# Patient Record
(demographics unavailable — no encounter records)

---

## 2024-11-18 NOTE — DATA REVIEWED
[de-identified] : X-rays of the Shoulder (2 views) were obtained and independently reviewed at today's office visit on 11/18/24. Continued visualization of maintained reduction.  X-rays of the Shoulder (2 views) were obtained at [facility] [place] [facility] on 11/4/24 and reviewed at today's office visit. Interval reduction of the glenohumeral joint. Flattening of the posterolateral aspect of the humeral head best seen on axillary view, consistent with Hill-Sachs deformity.

## 2024-11-18 NOTE — HISTORY OF PRESENT ILLNESS
[FreeTextEntry1] : Juanis is a 12 year old, otherwise healthy, female presenting to the office today with her father for initial pediatric orthopedic evaluation of a right shoulder injury. Patient was spiking at [employer] on 11/4/24, now 2 weeks ago, when she dislocated her right shoulder. Patient was seen at [facility] after sustaining the injury where she underwent closed reduction of right shoulder dislocation, placed in a sling for immobilization, and was recommended for further orthopedic evaluation. Of note, patient has previous history of a right shoulder dislocation in July 2024. This now the second episode. At that time, patient underwent a course of physical therapy for the shoulder.  Today, she reports significant improvement in her pain and discomfort. She has been compliant with the sling. She is not requiring pain medications. Patient denies any numbness or tingling. Here today for further orthopedic evaluation of the above.

## 2024-11-18 NOTE — PHYSICAL EXAM
[FreeTextEntry1] : GENERAL: alert, cooperative, in NAD SKIN: The skin is intact, warm, pink and dry over the area examined. EYES: Normal conjunctiva, normal eyelids and pupils were equal and round. ENT: normal ears, normal nose and normal lips. CARDIOVASCULAR: brisk capillary refill, but no peripheral edema. RESPIRATORY: The patient is in no apparent respiratory distress. They're taking full deep breaths without use of accessory muscles or evidence of audible wheezes or stridor without the use of a stethoscope. Normal respiratory effort. ABDOMEN: not examined  Right Shoulder: Skin is clean and intact. No skin tenting or irritation. No blanching of skin. No ecchymosis. There is no tenderness with palpation globally over the right shoulder.  Limited active range of motion about the shoulder. FROM of the elbow, wrist, hand. Neurovascularly intact in r/m/u/ain distribution with 5/5 strength Radial pulse 2+. Brisk capillary refill in all digits.

## 2024-11-18 NOTE — REASON FOR VISIT
[Initial Evaluation] : an initial evaluation [Patient] : patient [Father] : father [FreeTextEntry1] : right shoulder dislocation

## 2024-11-18 NOTE — REVIEW OF SYSTEMS
[Change in Activity] : change in activity [Joint Pains] : arthralgias [Appropriate Age Development] : development appropriate for age [Fever Above 102] : no fever [Malaise] : no malaise [Rash] : no rash [Itching] : no itching [Eye Pain] : no eye pain [Redness] : no redness [Nasal Stuffiness] : no nasal congestion [Sore Throat] : no sore throat [Heart Problems] : no heart problems [Tachypnea] : no tachypnea [Wheezing] : no wheezing [Vomiting] : no vomiting [Joint Swelling] : no joint swelling

## 2024-11-18 NOTE — ASSESSMENT
[FreeTextEntry1] : Juanis is a 12 year old female with a right shoulder dislocation, recurrent episode.  -Today's assessment was performed with the assistance of the patient's parent as an independent historian given the patient's age, who could not be considered a reliable history/due to the nonverbal nature given the patient's young age. -Clinical findings, x-ray results, and documentation from hospital were reviewed at length with the patient and parent. The condition, natural history, and prognosis were explained to the patient and family. -X-rays of the Shoulder (2 views) were obtained at [facility] on 11/4/24 and reviewed at today's office visit. Interval reduction of the glenohumeral joint. Flattening of the posterolateral aspect of the humeral head best seen on axillary view, consistent with Hill-Sachs deformity. -X-rays of the Shoulder (2 views) were obtained and independently reviewed at today's office visit on 11/18/24. Continued visualization of maintained reduction. -Clinically, she has improvement in her pain and discomfort with limited range of motion of the shoulder. -Recommendation at this time is to wean out of the sling over the next 1 week as tolerated.  -As this is her second episode of dislocation, we also recommend that she obtain MRI of the Right Shoulder to rule out internal derangement and further evaluate her injury. Study was ordered at today's visit and our office will reach out to patient's family to obtain insurance authorization. -She should refrain from all activities, gym, sports, etc. at this time. School note provided at today's office visit. -We will see her back in the office in after the MRI is obtained for reevaluation and to review results.   All questions and concerns were addressed today. Parent and patient verbalize understanding and agree with plan of care.  I, Edith Jones PA-C, have acted as a scribe and documented the above information for Dr. Merino.

## 2025-01-13 NOTE — HISTORY OF PRESENT ILLNESS
[FreeTextEntry1] : Juanis is a 12 year old F with R shoulder dislocation event on 11/4/24.  Patient was spiking at [place] on 11/4/24, when she dislocated her right shoulder. Patient was seen at [facility] ER after sustaining the injury where she underwent closed reduction of right shoulder dislocation, placed in a sling for immobilization, and was recommended for further orthopedic evaluation. Of note, patient has previous history of a right shoulder dislocation in July 2024. This now the second episode. At that time, patient underwent a course of physical therapy for the shoulder. At visit in our office on 11/18/24, we recommended 1 more week of immobilization with a sling and also to have MR performed. See previous notes for details.   Today, she reports significant improvement in her pain and discomfort. She discontinued the sling after 1 week after last visit, as recommended. She denies any problems with shoulder movement. She is not requiring pain medications. Patient denies any numbness or tingling. Here today for further orthopedic evaluation of the above.

## 2025-01-13 NOTE — DATA REVIEWED
[de-identified] : 11/21/2024: MR R shoulder:  FINDINGS: ROTATOR CUFF: The supraspinatus, infraspinatus, subscapularis, and teres minor tendons are intact. MUSCLES: There is edema along the caudal myotendinous fibers of the subscapularis muscle consistent with muscle strain. No fatty atrophy of the rotator cuff musculature. LONG HEAD BICEPS TENDON: Long head biceps tendon is intact. There is circumferential fluid within the extra articular biceps tendon sheath likely related to decompression of joint fluid. GLENOHUMERAL JOINT: There is an acute appearing Hill-Sachs deformity along post lateral aspect of the humeral head measuring up to 1.8 cm in transverse dimension and 1.3 cm in craniocaudal dimension. The depth of the Hill-Sachs deformity measures up to 0.4 cm. The anteroinferior glenoid labrum is blunted and diminutive in appearance consistent with associated labral tear. No definite osseous component. There is mild intrinsic periligamentous edema about the inferior capsule suggestive of capsular sprain. SYNOVIUM: Small glenohumeral joint effusion. No subacromial/subdeltoid bursitis. ACROMIOCLAVICULAR JOINT: Intact. MARROW: Patient is skeletally immature. NERVES: Intact. SUBCUTANEOUS TISSUES: Unremarkable. IMPRESSION: Findings consistent with recent anterior translational injury. Acute shallow Hill-Sachs deformity along the posterior lateral aspect of the humeral head. Anteroinferior labrum is blunted and diminutive consistent with labral tearing. No definite osseous component. Mild sprain of the inferior capsular structures. Mild to moderate strain of the caudal myotendinous fibers of the subscapularis tendon. No rotator cuff tear.   X-rays of the Shoulder (2 views) were obtained and independently reviewed at today's office visit on 11/18/24. Continued visualization of maintained reduction.  X-rays of the Shoulder (2 views) were obtained at [facility] on 11/4/24 and reviewed at today's office visit. Interval reduction of the glenohumeral joint. Flattening of the posterolateral aspect of the humeral head best seen on axillary view, consistent with Hill-Sachs deformity.

## 2025-01-13 NOTE — HISTORY OF PRESENT ILLNESS
[FreeTextEntry1] : Juanis is a 12 year old F with R shoulder dislocation event on 11/4/24.  Patient was spiking at volleyball on 11/4/24, when she dislocated her right shoulder. [place] [facility] [place] the injury where she underwent closed reduction of right shoulder dislocation, placed in a sling for immobilization, and was recommended for further orthopedic evaluation. Of note, patient has previous history of a right shoulder dislocation in July 2024. This now the second episode. At that time, patient underwent a course of physical therapy for the shoulder. At visit in our office on 11/18/24, we recommended 1 more week of immobilization with a sling and also to have MR performed. See previous notes for details.   Today, she reports significant improvement in her pain and discomfort. She discontinued the sling after 1 week after last visit, as recommended. She denies any problems with shoulder movement. She is not requiring pain medications. Patient denies any numbness or tingling. Here today for further orthopedic evaluation of the above.

## 2025-01-13 NOTE — PHYSICAL EXAM
[FreeTextEntry1] : General: healthy appearing, acting appropriate for age.  HEENT: NCAT, Normal conjunctiva Cardio: Appears well perfused, no peripheral edema, brisk cap refill.  Lungs: no obvious increased WOB, no audible wheeze heard without use of stethoscope.  Abdomen: not examined.  Skin: No visible rashes on exposed skin  Right Shoulder: Skin is clean and intact. No skin tenting or irritation. No blanching of skin. No ecchymosis. No tenderness with palpation globally over the right shoulder.  FROM of the shoulder, painless, no clicking.  FROM of the elbow, wrist, hand. Neurovascularly intact in r/m/u/ain distribution with 5/5 strength Radial pulse 2+. Brisk capillary refill in all digits.

## 2025-01-13 NOTE — PHYSICAL EXAM
[FreeTextEntry1] : General: healthy appearing, acting appropriate for age.  HEENT: NCAT, Normal conjunctiva Cardio: Appears well perfused, no peripheral edema, brisk cap refill.  Lungs: no obvious increased WOB, no audible wheeze heard without use of stethoscope.  Abdomen: not examined.  Skin: No visible rashes on exposed skin  Right Shoulder: Skin is clean and intact. No skin tenting or irritation. No blanching of skin. No ecchymosis. No tenderness with palpation globally over the right shoulder.  FROM of the shoulder, painless, no clicking.  FROM of the elbow, wrist, hand. Neurovascularly intact in r/m/u/ain distribution with 5/5 strength Radial pulse 2+. Brisk capillary refill in all digits. home

## 2025-01-13 NOTE — REASON FOR VISIT
[Follow Up] : a follow up visit [Patient] : patient [Father] : father [FreeTextEntry1] : right shoulder dislocation

## 2025-01-13 NOTE — ASSESSMENT
[FreeTextEntry1] : Juanis is a 12 year old female with a right shoulder dislocation, recurrent episode.  - Today's assessment was performed with the assistance of the patient's parent as an independent historian given the patient's age, who could not be considered a reliable history/due to the nonverbal nature given the patient's young age. - Clinical findings, x-ray results, and documentation from hospital were reviewed at length with the patient and parent. The condition, natural history, and prognosis were explained to the patient and family. - MR R shoulder: IMPRESSION: Findings consistent with recent anterior translational injury. Acute shallow Hill-Sachs deformity along the posterior lateral aspect of the humeral head. Anteroinferior labrum is blunted and diminutive consistent with labral tearing. No definite osseous component. Mild sprain of the inferior capsular structures. Mild to moderate strain of the caudal myotendinous fibers of the subscapularis tendon. No rotator cuff tear. - Clinically, she has improvement in her pain and discomfort, improvement with range of motion of the shoulder. - No immobilization needed.  - Recommend a course of PT, Rx provided.  - She should refrain from all activities, gym, sports, etc. at this time. School note provided at today's office visit. - Return in 8 weeks for f/u evaluation. No XRs need to be ordered in advance.   All questions and concerns were addressed today. Parent and patient verbalize understanding and agree with plan of care.  I, Kusum Quan PA-C, have acted as a scribe and documented the above information for Dr. Merino.

## 2025-01-13 NOTE — DATA REVIEWED
[de-identified] : 11/21/2024: MR R shoulder:  FINDINGS: ROTATOR CUFF: The supraspinatus, infraspinatus, subscapularis, and teres minor tendons are intact. MUSCLES: There is edema along the caudal myotendinous fibers of the subscapularis muscle consistent with muscle strain. No fatty atrophy of the rotator cuff musculature. LONG HEAD BICEPS TENDON: Long head biceps tendon is intact. There is circumferential fluid within the extra articular biceps tendon sheath likely related to decompression of joint fluid. GLENOHUMERAL JOINT: There is an acute appearing Hill-Sachs deformity along post lateral aspect of the humeral head measuring up to 1.8 cm in transverse dimension and 1.3 cm in craniocaudal dimension. The depth of the Hill-Sachs deformity measures up to 0.4 cm. The anteroinferior glenoid labrum is blunted and diminutive in appearance consistent with associated labral tear. No definite osseous component. There is mild intrinsic periligamentous edema about the inferior capsule suggestive of capsular sprain. SYNOVIUM: Small glenohumeral joint effusion. No subacromial/subdeltoid bursitis. ACROMIOCLAVICULAR JOINT: Intact. MARROW: Patient is skeletally immature. NERVES: Intact. SUBCUTANEOUS TISSUES: Unremarkable. IMPRESSION: Findings consistent with recent anterior translational injury. Acute shallow Hill-Sachs deformity along the posterior lateral aspect of the humeral head. Anteroinferior labrum is blunted and diminutive consistent with labral tearing. No definite osseous component. Mild sprain of the inferior capsular structures. Mild to moderate strain of the caudal myotendinous fibers of the subscapularis tendon. No rotator cuff tear.   X-rays of the Shoulder (2 views) were obtained and independently reviewed at today's office visit on 11/18/24. Continued visualization of maintained reduction.  X-rays of the Shoulder (2 views) were obtained at [facility] [place] [facility] on 11/4/24 and reviewed at today's office visit. Interval reduction of the glenohumeral joint. Flattening of the posterolateral aspect of the humeral head best seen on axillary view, consistent with Hill-Sachs deformity.

## 2025-03-10 NOTE — DATA REVIEWED
[de-identified] : No new imaging was obtained during today's visit. Prior obtained imaging was once again reviewed and is as noted below.  MR Right shoulder obtained on 11/21/2024 was independently reviewed today with patient and parent. The supraspinatus, infraspinatus, subscapularis, and teres minor tendons are intact. There is edema along the caudal myotendinous fibers of the subscapularis muscle consistent with muscle strain. No fatty atrophy of the rotator cuff musculature. Long head biceps tendon is intact. There is circumferential fluid within the extra articular biceps tendon sheath likely related to decompression of joint fluid. There is an acute appearing Hill-Sachs deformity along post lateral aspect of the humeral head measuring up to 1.8 cm in transverse dimension and 1.3 cm in craniocaudal dimension. The depth of the Hill-Sachs deformity measures up to 0.4 cm. The anteroinferior glenoid labrum is blunted and diminutive in appearance consistent with associated labral tear. No definite osseous component. There is mild intrinsic periligamentous edema about the inferior capsule suggestive of capsular sprain.   X-rays of the Shoulder (2 views) were obtained on 11/18/24. Continued visualization of maintained reduction.  X-rays of the Shoulder (2 views) were obtained at [facility] on 11/4/24 and reviewed at today's office visit. Interval reduction of the glenohumeral joint. Flattening of the posterolateral aspect of the humeral head best seen on axillary view, consistent with Hill-Sachs deformity.

## 2025-03-10 NOTE — HISTORY OF PRESENT ILLNESS
[FreeTextEntry1] : Juanis is a 12 year old female with a right shoulder dislocation event on 11/4/24.  Patient was spiking at volleyball on 11/4/24, when she dislocated her right shoulder. Patient was seen at [facility] [place] [facility] after sustaining the injury where she underwent closed reduction of right shoulder dislocation, placed in a sling for immobilization, and was recommended for further orthopedic evaluation. At visit in our office on 11/18/24, we recommended 1 more week of immobilization with a sling and also to have MR performed. Her MRI was reviewed on 1/13/25 at which time surgical intervention was discussed and PT was ordered. Please see prior clinic notes for additional information.   Today, she reports only mild discomfort about the shoulder/proximal humerus when carrying her backpack. She completed 4 sessions of PT before her insurance told her it would no longer be covered. She denies any problems with shoulder movement. She remains out of activities. She is not requiring pain medications. Patient denies any numbness or tingling. Here today for further orthopedic evaluation of the above.    Of note, patient has previous history of a right shoulder dislocation in July 2024. At that time, patient underwent a course of physical therapy for the shoulder

## 2025-03-10 NOTE — PHYSICAL EXAM
[FreeTextEntry1] : General: healthy appearing, acting appropriate for age.  HEENT: NCAT, Normal conjunctiva Cardio: Appears well perfused, no peripheral edema, brisk cap refill.  Lungs: no obvious increased WOB, no audible wheeze heard without use of stethoscope.  Abdomen: not examined.  Skin: No visible rashes on exposed skin  Right Shoulder: Skin is clean and intact. No skin tenting or irritation. No blanching of skin. No ecchymosis. Mild tenderness about the proximal humerus No other tenderness with palpation globally over the right shoulder.  FROM of the shoulder, painless, no clicking.  FROM of the elbow, wrist, hand. 5/5 motor strength with rotator cuff testing. Mildly positive apprehension test. Neurovascularly intact in r/m/u/ain distribution with 5/5 strength distally. Radial pulse 2+. Brisk capillary refill in all digits.

## 2025-03-10 NOTE — REVIEW OF SYSTEMS
[Change in Activity] : change in activity [Appropriate Age Development] : development appropriate for age [Fever Above 102] : no fever [Malaise] : no malaise [Rash] : no rash [Itching] : no itching [Eye Pain] : no eye pain [Redness] : no redness [Nasal Stuffiness] : no nasal congestion [Sore Throat] : no sore throat [Heart Problems] : no heart problems [Tachypnea] : no tachypnea [Wheezing] : no wheezing [Vomiting] : no vomiting [Limping] : no limping [Joint Pains] : no arthralgias [Joint Swelling] : no joint swelling [Sleep Disturbances] : ~T no sleep disturbances

## 2025-03-10 NOTE — ASSESSMENT
[FreeTextEntry1] : 12 year old female with a right shoulder dislocation, one in July 2024 one in November 2024.  -We discussed SHANTANU's interval progress and physical exam at length during today's visit with patient and her parent/guardian who served as an independent historian due to child's age and unreliable nature of history. -The etiology, pathoanatomy, treatment modalities, and expected natural history of shoulder dislovations were discussed at length today. -Clinically, she has only mild discomfort about the proximal humerus. She has full shoulder range of motion. She has a mildly positive apprehension test.  -Treatment options were again discussed in detail. At this time, patient and family would like to hold off on any operative intervention. Therefore, we recommended attempting to return to physical therapy for shoulder strengthening/stabilization was discussed and recommended.  -We again discussed that should she experience further instability or pain despite PT, surgical options will be further discussed. -She should refrain from all activities, gym, sports, etc until 4/1/25. As of 4/1/25 she can return to non contact sports. No overhead lifting. School note provided at today's office visit. -Return in 4 months for f/u evaluation. If she continues to have pain referral for operative intervention will be discussed.   All questions and concerns were addressed today. Parent and patient verbalize understanding and agree with plan of care.   I, Beatrice Wilburn, have acted as a scribe and documented the above information for Dr. Merino.   This note was created using Dragon Voice Recognition Software and may have been partially created using [employer] software which was then reviewed and edited to the best of my ability. Sporadic inaccurate translation may have occurred. If there are any questions about content of the note, please contact the office for clarification.

## 2025-03-10 NOTE — REASON FOR VISIT
[Follow Up] : a follow up visit [Patient] : patient [Mother] : mother [FreeTextEntry1] : right shoulder dislocation

## 2025-07-22 NOTE — END OF VISIT
[FreeTextEntry3] : IDanie MD, personally saw and evaluated the patient and developed the plan as documented above. I concur or have edited the note as appropriate.

## 2025-07-22 NOTE — DATA REVIEWED
[de-identified] : No new imaging was obtained during today's visit. Prior obtained imaging was once again reviewed and is as noted below.  MR Right shoulder obtained on 11/21/2024 was independently reviewed with patient and parent. The supraspinatus, infraspinatus, subscapularis, and teres minor tendons are intact. There is edema along the caudal myotendinous fibers of the subscapularis muscle consistent with muscle strain. No fatty atrophy of the rotator cuff musculature. Long head biceps tendon is intact. There is circumferential fluid within the extra articular biceps tendon sheath likely related to decompression of joint fluid. There is an acute appearing Hill-Sachs deformity along post lateral aspect of the humeral head measuring up to 1.8 cm in transverse dimension and 1.3 cm in craniocaudal dimension. The depth of the Hill-Sachs deformity measures up to 0.4 cm. The anteroinferior glenoid labrum is blunted and diminutive in appearance consistent with associated labral tear. No definite osseous component. There is mild intrinsic periligamentous edema about the inferior capsule suggestive of capsular sprain.   X-rays of the Shoulder (2 views) were obtained on 11/18/24. Continued visualization of maintained reduction.  X-rays of the Shoulder (2 views) were obtained at [facility] on 11/4/24 and reviewed at today's office visit. Interval reduction of the glenohumeral joint. Flattening of the posterolateral aspect of the humeral head best seen on axillary view, consistent with Hill-Sachs deformity.

## 2025-07-22 NOTE — HISTORY OF PRESENT ILLNESS
[FreeTextEntry1] : Juanis is a 13 year old female with a right shoulder dislocation event on 11/4/24.  Patient was spiking at volleyball on 11/4/24, when she dislocated her right shoulder. Patient [place] [facility] [place] sustaining the injury where she underwent closed reduction of right shoulder dislocation, placed in a sling for immobilization, and was recommended for further orthopedic evaluation. At visit in our office on 11/18/24, we recommended 1 more week of immobilization with a sling and also to have MR performed. Her MRI was reviewed on 1/13/25 at which time surgical intervention was discussed and PT was ordered. Please see prior clinic notes for additional information.   Today, she reports she is doing well with no recent complaints of pain or discomfort. She completed physical therapy. She denies any problems with shoulder movement. She denies any activity limitations. No further dislocation events. Patient denies any numbness or tingling. Here today for further orthopedic evaluation of the above.  Of note, patient has previous history of a right shoulder dislocation in July 2024. At that time, patient underwent a course of physical therapy for the shoulder.

## 2025-07-22 NOTE — ASSESSMENT
[FreeTextEntry1] : 13 year old female with a right shoulder dislocation, one in July 2024 one in November 2024. Overall, doing well.  -We discussed SHANTANU's interval progress and physical exam at length during today's visit with patient and her parent/guardian who served as an independent historian due to child's age and unreliable nature of history. -The etiology, pathoanatomy, treatment modalities, and expected natural history of shoulder dislocations were discussed at length today. -Clinically, she is doing well with no recent complaints of pain or discomfort, full shoulder ROM and no limitations in activity.  -We again discussed that should she experience further instability or pain despite PT, surgical options will be further discussed. -She can continue to participate in activities as she tolerates. -Follow up recommended in my office on an as needed basis if she has any further dislocation events or any other concerns.   All questions and concerns were addressed today. Family verbalizes understanding and agree with plan of care.   I, Soni Donis PA-C, have acted as a scribe and documented the above information for Dr. Merino.

## 2025-07-22 NOTE — PHYSICAL EXAM
[FreeTextEntry1] : General: healthy appearing, acting appropriate for age.  HEENT: NCAT, Normal conjunctiva Cardio: Appears well perfused, no peripheral edema, brisk cap refill.  Lungs: no obvious increased WOB, no audible wheeze heard without use of stethoscope.  Abdomen: not examined.  Skin: No visible rashes on exposed skin  Right Shoulder: Skin is clean and intact. No skin tenting or irritation. No blanching of skin. No ecchymosis. Mild tenderness about the proximal humerus No other tenderness with palpation globally over the right shoulder.  FROM of the shoulder, painless, no clicking.  FROM of the elbow, wrist, hand. 5/5 motor strength with rotator cuff testing. Negative apprehension test. No gross shoulder instability. Neurovascularly intact in r/m/u/ain distribution with 5/5 strength distally. Radial pulse 2+. Brisk capillary refill in all digits.

## 2025-07-22 NOTE — HISTORY OF PRESENT ILLNESS
[FreeTextEntry1] : Juanis is a 13 year old female with a right shoulder dislocation event on 11/4/24.  Patient was spiking at volleyball on 11/4/24, when she dislocated her right shoulder. [place] [facility] [place] the injury where she underwent closed reduction of right shoulder dislocation, placed in a sling for immobilization, and was recommended for further orthopedic evaluation. At visit in our office on 11/18/24, we recommended 1 more week of immobilization with a sling and also to have MR performed. Her MRI was reviewed on 1/13/25 at which time surgical intervention was discussed and PT was ordered. Please see prior clinic notes for additional information.   Today, she reports she is doing well with no recent complaints of pain or discomfort. She completed physical therapy. She denies any problems with shoulder movement. She denies any activity limitations. No further dislocation events. Patient denies any numbness or tingling. Here today for further orthopedic evaluation of the above.  Of note, patient has previous history of a right shoulder dislocation in July 2024. At that time, patient underwent a course of physical therapy for the shoulder.

## 2025-07-22 NOTE — DATA REVIEWED
[de-identified] : No new imaging was obtained during today's visit. Prior obtained imaging was once again reviewed and is as noted below.  MR Right shoulder obtained on 11/21/2024 was independently reviewed with patient and parent. The supraspinatus, infraspinatus, subscapularis, and teres minor tendons are intact. There is edema along the caudal myotendinous fibers of the subscapularis muscle consistent with muscle strain. No fatty atrophy of the rotator cuff musculature. Long head biceps tendon is intact. There is circumferential fluid within the extra articular biceps tendon sheath likely related to decompression of joint fluid. There is an acute appearing Hill-Sachs deformity along post lateral aspect of the humeral head measuring up to 1.8 cm in transverse dimension and 1.3 cm in craniocaudal dimension. The depth of the Hill-Sachs deformity measures up to 0.4 cm. The anteroinferior glenoid labrum is blunted and diminutive in appearance consistent with associated labral tear. No definite osseous component. There is mild intrinsic periligamentous edema about the inferior capsule suggestive of capsular sprain.   X-rays of the Shoulder (2 views) were obtained on 11/18/24. Continued visualization of maintained reduction.  X-rays of the Shoulder (2 views) were [place] [facility] [place] and reviewed at today's office visit. Interval reduction of the glenohumeral joint. Flattening of the posterolateral aspect of the humeral head best seen on axillary view, consistent with Hill-Sachs deformity.